# Patient Record
Sex: FEMALE | Race: WHITE | NOT HISPANIC OR LATINO | Employment: UNEMPLOYED | URBAN - NONMETROPOLITAN AREA
[De-identification: names, ages, dates, MRNs, and addresses within clinical notes are randomized per-mention and may not be internally consistent; named-entity substitution may affect disease eponyms.]

---

## 2017-09-19 ENCOUNTER — TRANSCRIBE ORDERS (OUTPATIENT)
Dept: ADMINISTRATIVE | Facility: HOSPITAL | Age: 52
End: 2017-09-19

## 2017-09-19 DIAGNOSIS — M54.5 LOW BACK PAIN, UNSPECIFIED BACK PAIN LATERALITY, UNSPECIFIED CHRONICITY, WITH SCIATICA PRESENCE UNSPECIFIED: Primary | ICD-10-CM

## 2017-09-29 ENCOUNTER — HOSPITAL ENCOUNTER (OUTPATIENT)
Dept: MRI IMAGING | Facility: HOSPITAL | Age: 52
Discharge: HOME | End: 2017-09-29
Admitting: CHIROPRACTOR

## 2017-09-29 DIAGNOSIS — M54.5 LOW BACK PAIN, UNSPECIFIED BACK PAIN LATERALITY, UNSPECIFIED CHRONICITY, WITH SCIATICA PRESENCE UNSPECIFIED: ICD-10-CM

## 2017-09-29 PROCEDURE — 72148 MRI LUMBAR SPINE W/O DYE: CPT

## 2017-11-07 ENCOUNTER — OFFICE VISIT (OUTPATIENT)
Dept: NEUROSURGERY | Facility: CLINIC | Age: 52
End: 2017-11-07

## 2017-11-07 VITALS
BODY MASS INDEX: 29.02 KG/M2 | WEIGHT: 170 LBS | SYSTOLIC BLOOD PRESSURE: 112 MMHG | DIASTOLIC BLOOD PRESSURE: 74 MMHG | HEIGHT: 64 IN

## 2017-11-07 DIAGNOSIS — M51.26 DISPLACEMENT OF LUMBAR INTERVERTEBRAL DISC WITHOUT MYELOPATHY: Primary | ICD-10-CM

## 2017-11-07 DIAGNOSIS — Z78.9 NON-SMOKER: ICD-10-CM

## 2017-11-07 PROCEDURE — 99204 OFFICE O/P NEW MOD 45 MIN: CPT | Performed by: NURSE PRACTITIONER

## 2017-11-07 RX ORDER — METHYLPREDNISOLONE 4 MG/1
TABLET ORAL
Qty: 21 EACH | Refills: 0 | Status: SHIPPED | OUTPATIENT
Start: 2017-11-07 | End: 2018-03-15

## 2017-11-07 RX ORDER — DICLOFENAC SODIUM 75 MG/1
75 TABLET, DELAYED RELEASE ORAL 2 TIMES DAILY
Qty: 60 TABLET | Refills: 2 | Status: SHIPPED | OUTPATIENT
Start: 2017-11-07

## 2017-11-07 NOTE — PROGRESS NOTES
Chief complaint:   Chief Complaint   Patient presents with   • Back Pain     Sherrill is referred today for her low back with left leg pain and numbness, tingling that goes down into the bottom of the foot.  She has not been to regular physical therapy, but has been to a chiropractor.         Subjective     HPI: This is a 52-year-old male patient who comes in today with complaint of back and left lower extremity pain.  She is here to be evaluated today.  She says that the pain in her back is been going on for some time and she has been worked with a chiropractor.  She says however back in  she started having pain radiating down into her left lower extremity in a posterior radicular fashion all the way to her foot along with numbness and tingling.  Denies any accident or injury.she describes the pain as a sharp shooting pain.  She says her leg pain is definitely more intense than her back issues at this time.  She he states that the pain in her leg is intermittent.  Its better when she sitting and worse when she standing.  The numbness and tingling is constant.  She denies any bowel or bladder issues.  She has done chiropractic care.  She has not any recent physical therapy or pain management injections.  She has been taking over-the-counter ibuprofen to help with the pain. He rates the pain a scale 0-10 at an 8.  At its most intense point.  She is .  She works as a housewife.    Review of Systems   HENT: Positive for hearing loss.    Musculoskeletal: Positive for back pain and myalgias.   Neurological: Positive for numbness.   All other systems reviewed and are negative.       Past Medical History:   Diagnosis Date   • No known health problems      Past Surgical History:   Procedure Laterality Date   •  SECTION     • TUBAL ABDOMINAL LIGATION       Family History   Problem Relation Age of Onset   • Arthritis Mother    • Hypertension Mother    • Cancer Father    • Heart disease Father    • No  "Known Problems Sister    • No Known Problems Brother    • No Known Problems Brother      Social History   Substance Use Topics   • Smoking status: Never Smoker   • Smokeless tobacco: None   • Alcohol use No       (Not in a hospital admission)  Allergies:  Review of patient's allergies indicates no known allergies.    Objective      Vital Signs  /74 (BP Location: Right arm, Patient Position: Sitting)  Ht 64\" (162.6 cm)  Wt 170 lb (77.1 kg)  BMI 29.18 kg/m2    Physical Exam   Constitutional: She is oriented to person, place, and time. She appears well-developed and well-nourished.   HENT:   Head: Normocephalic.   Eyes: Conjunctivae, EOM and lids are normal. Pupils are equal, round, and reactive to light.   Neck: Normal range of motion.   Cardiovascular: Normal rate, regular rhythm and normal heart sounds.    Pulmonary/Chest: Effort normal and breath sounds normal.   Abdominal: Normal appearance.   Musculoskeletal: Normal range of motion.        Lumbar back: She exhibits pain.   Neurological: She is alert and oriented to person, place, and time. She has normal strength and normal reflexes. She displays normal reflexes. No cranial nerve deficit or sensory deficit. GCS eye subscore is 4. GCS verbal subscore is 5. GCS motor subscore is 6.   Skin: Skin is warm.   Psychiatric: She has a normal mood and affect. Her speech is normal and behavior is normal. Thought content normal. Cognition and memory are normal.       Results Review: MRI of lumbar spine shows the patient does have a disc herniation present at L5-S1 that is eccentric off to the left causing pressure on the exiting nerve root.  No malalignment visual eyes.  No cord signal change.  No fracture visualized.          Assessment/Plan: at this point we are going to start the patient into a dedicated course of physical therapy consisting of 2-3 times a week for 4-6 weeks.  In addition of that we will start her on diclofenac and a Medrol Dosepak to see  If " this will help with the pain that she is expressing.  We will follow-up again with her in 6-8 weeks to see if she doing from the physical therapy.  We will have her see Dr. Corea the next appointment to see if we can  Continue with conservative treatments versus surgical intervention.      Sherrill was seen today for back pain.    Diagnoses and all orders for this visit:    Displacement of lumbar intervertebral disc without myelopathy  -     Ambulatory Referral to Physical Therapy Evaluate and treat (2-3 days a week for 4-6 weeks)    BMI 29.0-29.9,adult    Non-smoker    Other orders  -     diclofenac (VOLTAREN) 75 MG EC tablet; Take 1 tablet by mouth 2 (Two) Times a Day.  -     MethylPREDNISolone (MEDROL, DEANGELO,) 4 MG tablet; Take as directed on package instructions.        I discussed the patients findings and my recommendations with patient    Eduard Rivero, BERNARD  11/07/17  12:55 PM

## 2018-03-15 ENCOUNTER — OFFICE VISIT (OUTPATIENT)
Dept: NEUROSURGERY | Facility: CLINIC | Age: 53
End: 2018-03-15

## 2018-03-15 VITALS — WEIGHT: 177 LBS | BODY MASS INDEX: 30.22 KG/M2 | HEIGHT: 64 IN

## 2018-03-15 DIAGNOSIS — Z78.9 NON-SMOKER: ICD-10-CM

## 2018-03-15 DIAGNOSIS — M51.26 DISPLACEMENT OF LUMBAR INTERVERTEBRAL DISC WITHOUT MYELOPATHY: Primary | ICD-10-CM

## 2018-03-15 DIAGNOSIS — M79.605 LEFT LEG PAIN: ICD-10-CM

## 2018-03-15 PROBLEM — M79.604 RIGHT LEG PAIN: Status: RESOLVED | Noted: 2018-03-15 | Resolved: 2018-03-15

## 2018-03-15 PROBLEM — M79.604 RIGHT LEG PAIN: Status: ACTIVE | Noted: 2018-03-15

## 2018-03-15 PROCEDURE — 99213 OFFICE O/P EST LOW 20 MIN: CPT | Performed by: NEUROLOGICAL SURGERY

## 2018-03-15 NOTE — PROGRESS NOTES
"SUBJECTIVE:  Patient ID: Sherrill Dominguez is a 52 y.o. female is here today for follow-up.    Chief Complaint: Left leg pain  Chief Complaint   Patient presents with   • back & leg     patient has completed approx 12 PT sessions and says it didn't help her symptoms; she states her symptoms are a \"roller coaster ride\" - she got better with therapy in December but then her symptoms returned after a day of shoppping/walking.       HPI  52-year-old female that has been dealing with difficulty with some back pain and left lower extremity radiculopathy for about 8 months.  She complains of some back tightness but mostly left lower extremity radicular pain numbness and tingling that goes down to about her ankle.  She did a dedicated course of physical therapy for 12 sessions with traction without any meaningful improvement.  We had her on anti-inflammatories and steroids for several weeks again all with minimal improvement.  She is here in follow-up.      The following portions of the patient's history were reviewed and updated as appropriate: allergies, current medications, past family history, past medical history, past social history, past surgical history and problem list.    OBJECTIVE:    Review of Systems   Neurological: Positive for numbness.   All other systems reviewed and are negative.         Physical Exam   Constitutional: She is oriented to person, place, and time. She appears well-developed and well-nourished.   HENT:   Head: Normocephalic and atraumatic.   Right Ear: Hearing normal.   Left Ear: Hearing normal.   Eyes: EOM are normal. Pupils are equal, round, and reactive to light.   Neck: Normal range of motion.   Neurological: She is alert and oriented to person, place, and time. She has normal strength and normal reflexes. No cranial nerve deficit or sensory deficit. She displays a negative Romberg sign. GCS eye subscore is 4. GCS verbal subscore is 5. GCS motor subscore is 6. She displays no Babinski's sign " on the right side. She displays no Babinski's sign on the left side.   Psychiatric: Her speech is normal. Judgment normal. Cognition and memory are normal.       Neurologic Exam     Mental Status   Oriented to person, place, and time.   Speech: speech is normal     Cranial Nerves     CN III, IV, VI   Pupils are equal, round, and reactive to light.  Extraocular motions are normal.     Motor Exam     Strength   Strength 5/5 throughout.       Independent Review of Radiographic Studies:   MRI the lumbar spine shows a free fragment disc herniation off to the left putting impingement on the descending nerve root.    ASSESSMENT/PLAN:  Sherrill is gone through an extensive course of conservative care over the last several months for her left lower extremity radiculopathy.  She really has not improved at all.  At this point I think she would really benefit from a single level microdiscectomy at L5-S1 on the left.  The risks and benefits of the procedure were discussed at length which included but were not limited to infection, bleeding, paralysis, spinal fluid leak, bowel bladder incontinence, stroke, coma, and death.  She acknowledged understanding of this.  Her questions concerns were addressed.  We will get her preop and scheduled at her convenience.      1. Displacement of lumbar intervertebral disc without myelopathy    2. Left leg pain    3. Non-smoker    4. BMI 30.0-30.9,adult            Return for patient to call back with decision.      Ezra Corea MD

## 2018-03-15 NOTE — PATIENT INSTRUCTIONS
